# Patient Record
Sex: FEMALE | Race: ASIAN | NOT HISPANIC OR LATINO
[De-identification: names, ages, dates, MRNs, and addresses within clinical notes are randomized per-mention and may not be internally consistent; named-entity substitution may affect disease eponyms.]

---

## 2022-08-04 ENCOUNTER — NON-APPOINTMENT (OUTPATIENT)
Age: 62
End: 2022-08-04

## 2022-08-09 PROBLEM — Z00.00 ENCOUNTER FOR PREVENTIVE HEALTH EXAMINATION: Status: ACTIVE | Noted: 2022-08-09

## 2022-08-10 ENCOUNTER — TRANSCRIPTION ENCOUNTER (OUTPATIENT)
Age: 62
End: 2022-08-10

## 2022-08-10 ENCOUNTER — NON-APPOINTMENT (OUTPATIENT)
Age: 62
End: 2022-08-10

## 2022-08-10 ENCOUNTER — APPOINTMENT (OUTPATIENT)
Dept: ORTHOPEDIC SURGERY | Facility: CLINIC | Age: 62
End: 2022-08-10

## 2022-08-10 VITALS — WEIGHT: 135 LBS | HEIGHT: 61 IN | BODY MASS INDEX: 25.49 KG/M2

## 2022-08-10 PROCEDURE — 20611 DRAIN/INJ JOINT/BURSA W/US: CPT | Mod: RT

## 2022-08-10 PROCEDURE — 99204 OFFICE O/P NEW MOD 45 MIN: CPT | Mod: 25

## 2022-08-10 RX ORDER — ROSUVASTATIN CALCIUM 5 MG/1
TABLET, FILM COATED ORAL
Refills: 0 | Status: ACTIVE | COMMUNITY

## 2022-08-10 NOTE — HISTORY OF PRESENT ILLNESS
[de-identified] : RIGHT SHOULDER PAIN- RHD \par PAIN STARTED  JANUARY 2022- NO SPECIFIC INJURY  \par INTERMITTENT \par ACHY \par PAIN LEVEL: 0/10 \par BETTER WITH P.T, REST, TYLENOL \par WORSE WHEN LYING DOWN \par PT WENT TO PHYSICAL THERAPY- 10 SESSIONS LAST SESSION WAS LAST WEEK \par PT HAS MRI AVAILABLE FROM -  05/18/2022

## 2022-08-10 NOTE — PHYSICAL EXAM
[de-identified] : PHYSICAL EXAM  RIGHT  SHOULDER\par \par NORMAL POSTURE \par AROM 150 / 150 / 100  / 30\par TENDER: SA REGION LATERAL\par \par SPECIAL TESTING :\par BURNETT - POSITIVE \par MARIA EUGENIA - POSITIVE \par SPEED TEST - POSITIVE\par \par SHARMA - NEGATIVE \par APPREHENSION AND SUPPRESSION - NEGATIVE \par \par RC STRENGTH TESTING \par SS:  5/5\par SUB 5/5\par IS     5/5\par BICEPS  5/5\par \par SENSATION  - GROSSLY INTACT\par \par \par  [de-identified] : Exam Date: 05/18/2022  3:31 PM\par  Location: [facility]\par \par Procedure: MRI SHOULDER RIGHT WO CONTRAST\par IMPRESSION: \par \par MRI of the right shoulder demonstrates progression of the supraspinatus tear, currently appearing as a high-grade complex partial thickness tear with propagation posteriorly to involve the anterior fibers of infraspinatus. There is partial-thickness intrasubstance tears of the subscapularis tendon. Mild subacromial/subdeltoid bursitis with thickening of the coracoacromial ligament and narrowing of the supraspinatus outlet can be seen in the setting of subacromial impingement.

## 2022-08-10 NOTE — DISCUSSION/SUMMARY
[de-identified] : ULTRASOUND EVALUATION  REVEALS SMALL PARTIAL TEAR,   INFLAMMATORY CHANGES AND IMPINGEMENT \par PATIENT HAS ELECTED TO PROCEED WITH KENALOG INJECTION SHOULDER \par RISKS AND BENEFITS DISCUSSED - VERBAL CONSENT OBTAINED \par SEE PROCEDURE NOTE\par \par \par POST INJECTION INSTRUCTIONS:\par \par INJECTION THERAPY HANDOUT PROVIDED\par \par COLD THERAPY , ANALGESICS PRN\par \par HOME  EXERCISES QD - PENDULUM AND ROM  HANDOUT PROVIDED, REVIEWED AND DEMONSTRATED - REFERRED TO INSTRUCTIONAL VIDEO ON MY WEBSITE\par \par START P.T.  WITHIN 2 WEEKS AFTER INJECTION - 2 X 4 WEEKS \par \par CONSIDER ARTHROSCOPIC TREATMENT\par

## 2022-08-10 NOTE — PROCEDURE
[de-identified] : INJECTION RIGHT SHOULDER SA SPACE\par \par Patient has demonstrated limited relief from NSAIDS, rest, exercises / PT, and after discussion of the risks and benefits, the patient has elected to proceed with an ULTRASOUND GUIDED injection into the RIGHT SUBACROMIAL  SPACE LATERAL APPROACH \par  \par Confirmed that the patient does not have history of prior adverse reactions, active, infections, or relevant allergies. There was no effusion, erythema, or warmth, and the skin was clear\par \par The skin was sterilized with alcohol. Ethyl Chloride was used as a topical anesthetic. Routine sterile technique. \par The site was injected UTILIZING ULTRASOUND GUIDANCE to confirm appropriate placement of the needle-\par with a mixture of medication and local anesthetic. The injection was completed without complication and a bandage was applied.\par  \par The patient tolerated the procedure well and was given post-injection instructions.Rec: Cold therapy, analgesics, avoid heavy activity.\par MEDICATION: 4cc of 1% xylocaine + 40mg of KENALOG\par \par

## 2022-10-12 ENCOUNTER — APPOINTMENT (OUTPATIENT)
Dept: ORTHOPEDIC SURGERY | Facility: CLINIC | Age: 62
End: 2022-10-12

## 2022-10-12 DIAGNOSIS — M75.41 IMPINGEMENT SYNDROME OF RIGHT SHOULDER: ICD-10-CM

## 2022-10-12 DIAGNOSIS — M75.111 INCOMPLETE ROTATOR CUFF TEAR OR RUPTURE OF RIGHT SHOULDER, NOT SPECIFIED AS TRAUMATIC: ICD-10-CM

## 2022-10-12 PROCEDURE — 99213 OFFICE O/P EST LOW 20 MIN: CPT

## 2022-10-12 NOTE — HISTORY OF PRESENT ILLNESS
[de-identified] : RIGHT SHOULDER \par FOLLOW UP \par PAIN LEVEL: 0/10 \par PREVIOUS CORTISONE INJ-08/10/2022- HELPFUL \par PT WENT TO PHYSICAL THERPAY- 6 SESSIONS-HELPFUL \par AT HOME EXERCISES\par \par \par \par PREVIOUS HPI\par RIGHT SHOULDER PAIN- RHD \par PAIN STARTED JANUARY 2022- NO SPECIFIC INJURY \par INTERMITTENT \par ACHY \par PAIN LEVEL: 0/10 \par BETTER WITH P.T, REST, TYLENOL \par WORSE WHEN LYING DOWN \par PT WENT TO PHYSICAL THERAPY- 10 SESSIONS LAST SESSION WAS LAST WEEK \par PT HAS MRI AVAILABLE FROM - 05/18/2022

## 2022-10-12 NOTE — PHYSICAL EXAM
[de-identified] : PHYSICAL EXAM  RIGHT  SHOULDER\par \par NORMAL POSTURE \par AROM 135 / 135 / 90 / 20 \par TENDER: SA REGION LATERAL\par \par SPECIAL TESTING :\par BURNETT - POSITIVE \par MARIA EUGENIA - POSITIVE \par SPEED TEST - POSITIVE\par \par SHARMA - NEGATIVE \par APPREHENSION AND SUPPRESSION - NEGATIVE \par \par RC STRENGTH TESTING \par SS:  5/5\par SUB 5/5\par IS     5/5\par BICEPS  5/5\par \par SENSATION  - GROSSLY INTACT\par \par \par

## 2022-10-12 NOTE — DISCUSSION/SUMMARY
[de-identified] : DOING WELL WITH P.T. AFTER KENALOG INJ AUG 10\par \par PLAN:\par COMPLETE P.T. \par PROGRESS TO HOME EXERCISES\par \par CONSIDER REPEAT KENALOG IN PAINFUL SX RETURN\par \par